# Patient Record
Sex: MALE | Race: ASIAN | NOT HISPANIC OR LATINO | Employment: UNEMPLOYED | URBAN - METROPOLITAN AREA
[De-identification: names, ages, dates, MRNs, and addresses within clinical notes are randomized per-mention and may not be internally consistent; named-entity substitution may affect disease eponyms.]

---

## 2022-06-04 ENCOUNTER — HOSPITAL ENCOUNTER (EMERGENCY)
Facility: HOSPITAL | Age: 47
Discharge: HOME | End: 2022-06-04
Attending: STUDENT IN AN ORGANIZED HEALTH CARE EDUCATION/TRAINING PROGRAM | Admitting: STUDENT IN AN ORGANIZED HEALTH CARE EDUCATION/TRAINING PROGRAM
Payer: COMMERCIAL

## 2022-06-04 ENCOUNTER — APPOINTMENT (OUTPATIENT)
Dept: RADIOLOGY | Facility: HOSPITAL | Age: 47
End: 2022-06-04
Attending: STUDENT IN AN ORGANIZED HEALTH CARE EDUCATION/TRAINING PROGRAM
Payer: COMMERCIAL

## 2022-06-04 VITALS
HEART RATE: 83 BPM | HEIGHT: 60 IN | TEMPERATURE: 96.8 F | RESPIRATION RATE: 16 BRPM | DIASTOLIC BLOOD PRESSURE: 88 MMHG | BODY MASS INDEX: 30.43 KG/M2 | OXYGEN SATURATION: 96 % | WEIGHT: 155 LBS | SYSTOLIC BLOOD PRESSURE: 140 MMHG

## 2022-06-04 DIAGNOSIS — M25.561 ACUTE PAIN OF RIGHT KNEE: ICD-10-CM

## 2022-06-04 PROBLEM — H90.6 MIXED CONDUCTIVE AND SENSORINEURAL HEARING LOSS OF BOTH EARS: Status: ACTIVE | Noted: 2019-03-29

## 2022-06-04 PROCEDURE — 250N000013 HC RX MED GY IP 250 OP 250 PS 637: Performed by: STUDENT IN AN ORGANIZED HEALTH CARE EDUCATION/TRAINING PROGRAM

## 2022-06-04 PROCEDURE — 73562 X-RAY EXAM OF KNEE 3: CPT | Mod: RT

## 2022-06-04 PROCEDURE — 99283 EMERGENCY DEPT VISIT LOW MDM: CPT

## 2022-06-04 RX ORDER — ACETAMINOPHEN 325 MG/1
650 TABLET ORAL ONCE
Status: COMPLETED | OUTPATIENT
Start: 2022-06-04 | End: 2022-06-04

## 2022-06-04 RX ORDER — IBUPROFEN 600 MG/1
600 TABLET, FILM COATED ORAL ONCE
Status: COMPLETED | OUTPATIENT
Start: 2022-06-04 | End: 2022-06-04

## 2022-06-04 RX ADMIN — ACETAMINOPHEN 650 MG: 325 TABLET, FILM COATED ORAL at 18:37

## 2022-06-04 RX ADMIN — IBUPROFEN 600 MG: 600 TABLET ORAL at 18:37

## 2022-06-04 NOTE — DISCHARGE INSTRUCTIONS
Please take 600 mg ibuprofen every 6 hours as well as 500 mg of Tylenol every 4 hours for pain.  Please keep the knee wrapped for the next week or so.  The pain is persisting or worsening follow-up with [facility] listed.  You will have to call for an appointment.

## 2022-06-04 NOTE — ED PROVIDER NOTES
Emergency Department Encounter         FINAL IMPRESSION:  Knee sprain        ED COURSE AND MEDICAL DECISION MAKING       ED Course as of 06/04/22 1807   Sat Jun 04, 2022   1806 Patient is a 47-year-old hypertensive diabetic male here with right knee pain for the past week.  Patient states the pain started getting worse after he mowed the lawn.  No fevers, chills, nausea or vomiting.  No specific event where he twisted or hurt his knee.  Worsening pain with pressure and walking.  On arrival he looks well.  X-ray out of triage showing no findings.  Clinically, patient's right knee is not significant swollen compared to left however there is a small palpable effusion.  There is no redness.  He has full range of motion.  Normal pulses distally and proximally.  Soft compartments throughout the right lower extremity.  No external bruising or redness.  Plan for Ace wrap, RICE therapy and outpatient orthopedic follow-up.     5:58 PM I met with the patient and performed my initial exam.  I discussed the plan for care and the patient is agreeable with this plan. PPE: Provider wore gloves and paper mask.  I discussed the plan for discharge with the patient, and patient is agreeable. We discussed supportivecares at home and reasons for return to the ER including new or worsening symptoms - all questions and concerns addressed. Patient to be discharged by RN.           At the conclusion of the encounter I discussed the results of all the tests and the disposition. The questions were answered. The patient or family acknowledged understanding and was agreeable with the care plan.                  MEDICATIONS GIVEN IN THE EMERGENCY DEPARTMENT:  Medications   ibuprofen (ADVIL/MOTRIN) tablet 600 mg (has no administration in time range)   acetaminophen (TYLENOL) tablet 650 mg (has no administration in time range)       NEW PRESCRIPTIONS STARTED AT TODAY'S ED VISIT:  New Prescriptions    No medications on file       HPI     Patient  information obtained from: the patient    Use of Interpretor:Yes (In Person) - Language Nakia Urrutiaaroldo Kyle is a 47 year old male with no pertinent history who presents to this ED by personal vehicle for evaluation of knee pain.    The patient presents ~1 week after hurting his right knee while mowing the lawn.  His knee only hurts when he bends the joint or there is pressure on the area. He has not taken anything for pain.     The patient denies hip pain and any other symptoms or complaints at this time.     REVIEW OF SYSTEMS:  Review of Systems   Constitutional: Negative for fever, malaise  HEENT: Negative runny nose, sore throat, ear pain, neck pain  Respiratory: Negative for shortness of breath, cough, congestion  Cardiovascular: Negative for chest pain, leg edema  Gastrointestinal: Negative for abdominal distention, abdominal pain, constipation, vomiting, nausea, diarrhea  Genitourinary: Negative for dysuria and hematuria.   Integument: Negative for rash, skin breakdown  Neurological: Negative for paresthesias, weakness, headache.  Musculoskeletal: Positive for right knee pain. Negative for hip pain.       All other systems reviewed and are negative.          MEDICAL HISTORY     No past medical history on file.    No past surgical history on file.         HYDROcodone-acetaminophen 5-325 mg per tablet            PHYSICAL EXAM     BP (!) 140/88   Pulse 83   Temp 96.8  F (36  C) (Temporal)   Resp 16   Ht 1.524 m (5')   Wt 70.3 kg (155 lb)   SpO2 96%   BMI 30.27 kg/m        PHYSICAL EXAM:     General: Patient appears well, nontoxic, comfortable  HEENT: Moist mucous membranes, no tongue swelling.  No head trauma.  No midline neck pain.    Abdominal: Soft, nontender, nondistended, no palpable masses, no guarding, no rebound  Musculoskeletal: right knee is not significant swollen compared to left however there is a small palpable effusion.  There is no redness.  He has full range of motion.  Normal pulses  distally and proximally.  Soft compartments.  Quadriceps tendon intact.  Neurological: Alert and oriented, grossly neurologically intact.  Psychological: Normal affect and mood.  Integument: No rashes appreciated. No external bruising or redness.           RESULTS       Labs Ordered and Resulted from Time of ED Arrival to Time of ED Departure - No data to display    XR Knee Right 3 Views   Final Result   IMPRESSION: Normal joint spaces and alignment. No fracture or joint effusion.                        PROCEDURES:  Procedures:  Procedures       I, Paul Fernandez am serving as a scribe to document services personally performed by Levon Kothari DO, based on my observations and the provider's statements to me.  ILevon DO, attest that Paul Fernandez is acting in a scribe capacity, has observed my performance of the services and has documented them in accordance with my direction.    Levon Kothari DO  Emergency Medicine  [facility] EMERGENCY DEPARTMENT     Levon Kothari DO  06/04/22 3760       Levon Kothari DO  06/04/22 1934

## 2022-06-04 NOTE — ED TRIAGE NOTES
Pt having right knee pain since mowing the lawn last week.  No fall. Only pain with walking or pressure     Triage Assessment     Row Name 06/04/22 1084       Triage Assessment (Adult)    Airway WDL WDL       Respiratory WDL    Respiratory WDL WDL       Skin Circulation/Temperature WDL    Skin Circulation/Temperature WDL WDL       Cardiac WDL    Cardiac WDL WDL       Peripheral/Neurovascular WDL    Peripheral Neurovascular WDL WDL       Cognitive/Neuro/Behavioral WDL    Cognitive/Neuro/Behavioral WDL WDL

## 2022-07-23 ENCOUNTER — HEALTH MAINTENANCE LETTER (OUTPATIENT)
Age: 47
End: 2022-07-23

## 2022-10-01 ENCOUNTER — HEALTH MAINTENANCE LETTER (OUTPATIENT)
Age: 47
End: 2022-10-01

## 2023-09-17 ENCOUNTER — HEALTH MAINTENANCE LETTER (OUTPATIENT)
Age: 48
End: 2023-09-17

## 2024-01-12 RX ORDER — PRASUGREL 10 MG/1
TABLET, FILM COATED ORAL
Qty: 90 TABLET | Refills: 0 | OUTPATIENT
Start: 2024-01-12

## 2024-01-12 NOTE — TELEPHONE ENCOUNTER
RN reviewed refill request for prasugrel from [facility]..      [facility].     RN denied refill and requested the pharmacy locate the correct provider.     Sharmila Real RN on 1/12/2024 at 12:36 PM

## 2024-11-10 ENCOUNTER — HEALTH MAINTENANCE LETTER (OUTPATIENT)
Age: 49
End: 2024-11-10